# Patient Record
Sex: MALE | Race: WHITE | Employment: OTHER | URBAN - METROPOLITAN AREA
[De-identification: names, ages, dates, MRNs, and addresses within clinical notes are randomized per-mention and may not be internally consistent; named-entity substitution may affect disease eponyms.]

---

## 2017-02-03 ENCOUNTER — OFFICE VISIT (OUTPATIENT)
Dept: CARDIOLOGY CLINIC | Age: 77
End: 2017-02-03

## 2017-02-03 VITALS
DIASTOLIC BLOOD PRESSURE: 78 MMHG | RESPIRATION RATE: 16 BRPM | HEIGHT: 63 IN | BODY MASS INDEX: 25.66 KG/M2 | WEIGHT: 144.8 LBS | OXYGEN SATURATION: 97 % | SYSTOLIC BLOOD PRESSURE: 130 MMHG | HEART RATE: 68 BPM

## 2017-02-03 DIAGNOSIS — I44.7 LBBB (LEFT BUNDLE BRANCH BLOCK): ICD-10-CM

## 2017-02-03 DIAGNOSIS — I49.5 SSS (SICK SINUS SYNDROME) (HCC): ICD-10-CM

## 2017-02-03 DIAGNOSIS — I47.29 NSVT (NONSUSTAINED VENTRICULAR TACHYCARDIA): ICD-10-CM

## 2017-02-03 DIAGNOSIS — I42.9 CARDIOMYOPATHY (HCC): Primary | ICD-10-CM

## 2017-02-03 DIAGNOSIS — R05.8 COUGH DUE TO ACE INHIBITOR: ICD-10-CM

## 2017-02-03 DIAGNOSIS — T46.4X5A COUGH DUE TO ACE INHIBITOR: ICD-10-CM

## 2017-02-03 DIAGNOSIS — F43.21 GRIEF REACTION: ICD-10-CM

## 2017-02-03 DIAGNOSIS — I44.0 HEART BLOCK AV FIRST DEGREE: ICD-10-CM

## 2017-02-03 RX ORDER — NAPROXEN SODIUM 220 MG
220 TABLET ORAL 2 TIMES DAILY WITH MEALS
COMMUNITY

## 2017-02-03 NOTE — MR AVS SNAPSHOT
Visit Information Date & Time Provider Department Dept. Phone Encounter #  
 2/3/2017  2:20 PM Isidoro Guaman MD [facility] Brit Castorena [phone] [id number] Your Appointments 3/14/2017  1:00 PM  
COMPLETE PHYSICAL with Sahil Moulton MD  
[address] [place] [address]) Appt Note: CPE CP $0 gb 1/ [address] [address]  
[phone]  
  
   
 [address] [facility] 99 [address] Upcoming Health Maintenance Date Due Pneumococcal 65+ Low/Medium Risk (2 of 2 - PCV13) 7/29/2014 DTaP/Tdap/Td series (2 - Td) 6/29/2016 INFLUENZA AGE 9 TO ADULT 8/1/2016 MEDICARE YEARLY EXAM 12/7/2016 GLAUCOMA SCREENING Q2Y 12/7/2017 Allergies as of 2/3/2017  Review Complete On: 2/3/2017 By: Lvei Rosa RN Severity Noted Reaction Type Reactions Pcn [Penicillins]  04/18/2016    Rash Current Immunizations  Reviewed on 2/4/2016 Name Date Influenza Vaccine 10/12/2015 Pneumococcal Polysaccharide (PPSV-23) 7/29/2013 Tdap 6/29/2006 Zoster Vaccine, Live 10/7/2013 Not reviewed this visit Vitals BP Pulse Resp Height(growth percentile) Weight(growth percentile) SpO2  
 130/78 (BP 1 Location: Left arm, BP Patient Position: Sitting) 68 16 5' 3\" (1.6 m) 144 lb 12.8 oz (65.7 kg) 97% BMI Smoking Status 25.65 kg/m2 Former Smoker BMI and BSA Data Body Mass Index Body Surface Area  
 25.65 kg/m 2 1.71 m 2 Preferred Pharmacy Pharmacy Name Phone [facility] [address] AT  [address] [phone] Your Updated Medication List  
  
   
This list is accurate as of: 2/3/17  2:40 PM.  Always use your most recent med list.  
  
  
  
  
 ALEVE 220 mg tablet Generic drug:  naproxen sodium Take 220 mg by mouth two (2) times daily (with meals). allopurinol 300 mg tablet Commonly known as:  ZYLOPRIM  
TAKE 1 TABLET BY MOUTH DAILY  
  
 aspirin 325 mg tablet Commonly known as:  ASPIRIN Take 325 mg by mouth daily. fluticasone 50 mcg/actuation nasal spray Commonly known as:  Bryan Caller 2 puffs each nostril at bedtime  
  
 losartan 50 mg tablet Commonly known as:  COZAAR Take 1 Tab by mouth daily. This replaces lisinopril. He should not take both  
  
 simvastatin 40 mg tablet Commonly known as:  ZOCOR Take 1 Tab by mouth nightly. Introducing [facility]! Dear Tennille Mcbride: 
Thank you for requesting a [employer] [place] account. Our records indicate that you already have an active [employer] [place] account. You can access your account anytime at [email or web address] Did you know that you can access your hospital and ER discharge instructions at any time in [employer] [place]? You can also review all of your test results from your hospital stay or ER visit. Additional Information If you have questions, please visit the Frequently Asked Questions section of the [employer] [place] website at [email or web address] Remember, [employer] [place] is NOT to be used for urgent needs. For medical emergencies, dial 911. Now available from your iPhone and Android! Please provide this summary of care documentation to your next provider. Your primary care clinician is listed as Luis Manuel Lincoln. If you have any questions after today's visit, please call [phone].

## 2017-02-03 NOTE — PROGRESS NOTES
HISTORY OF PRESENT ILLNESS  Nancy Becerril is a 67 y.o. male. He is seen in followup for cardiomyopathy, which was originally diagnosed in [place]. He had ablation for ventricular tachycardia there. He supposedly had normal coronary arteries by catheterization. He has left bundle branch block and his ejection fraction is mildly reduced at 45% by noninvasive testing. He continues to drink alcohol, but he is now drinking wine that he does not like as much so he will drink less of it. He is still grieving over the death of his wife. He was on a low-dose betablocker, but became bradycardic and had low blood pressure. He is now off carvedilol and he feels better. He is travelling frequently with his neighbor, who also lost her . Recent liver function tests show that his transaminases are normal, but his alkaline phosphatase is mildly elevated. He has chronic PVCs by EKG. [facility]  Patient Active Problem List   Diagnosis Code    Other and unspecified hyperlipidemia E78.5    Essential hypertension, benign I10    Sinus bradycardia R00.1    LBBB (left bundle branch block) I44.7    NSVT (nonsustained ventricular tachycardia) ([facility]) I47.2    CHF (congestive heart failure) ([facility]) I50.9    Cardiomyopathy ([facility]) I42.9    SSS (sick sinus syndrome) ([facility]) I49.5    Grief reaction F43.20    Osteopenia M85.80    Cough due to ACE inhibitor R05, T46.4X5A     Current Outpatient Prescriptions   Medication Sig Dispense Refill    naproxen sodium (ALEVE) 220 mg tablet Take 220 mg by mouth two (2) times daily (with meals).  allopurinol (ZYLOPRIM) 300 mg tablet TAKE 1 TABLET BY MOUTH DAILY 90 Tab 3    losartan (COZAAR) 50 mg tablet Take 1 Tab by mouth daily. This replaces lisinopril. He should not take both 90 Tab 3    simvastatin (ZOCOR) 40 mg tablet Take 1 Tab by mouth nightly. 90 Tab 3    aspirin (ASPIRIN) 325 mg tablet Take 325 mg by mouth daily.         fluticasone (FLONASE) 50 mcg/actuation nasal spray 2 puffs each nostril at bedtime 1 Bottle 11     Past Medical History   Diagnosis Date    [facility]rial fibrillation ([email or web address]     CAD (coronary artery disease)     CHF (congestive heart failure) ([facility] 2/27/2013    Essential hypertension     Hyperlipidemia     Myocardial infarction ([facility]     Stroke [facility])      Past Surgical History   Procedure Laterality Date    Hx appendectomy      Hx cervical laminectomy      Hx other surgical  2006     VT ablation        Review of Systems   Psychiatric/Behavioral: Positive for depression. All other systems reviewed and are negative. Visit Vitals    /78 (BP 1 Location: Left arm, BP Patient Position: Sitting)    Pulse 68    Resp 16    Ht 5' 3\" (1.6 m)    Wt 144 lb 12.8 oz (65.7 kg)    SpO2 97%    BMI 25.65 kg/m2       Physical Exam   Constitutional: He is oriented to person, place, and time. He appears well-nourished. HENT:   Head: Atraumatic. Eyes: Conjunctivae are normal.   Neck: Neck supple. Cardiovascular: Normal rate, regular rhythm and normal heart sounds. Exam reveals no gallop and no friction rub. No murmur heard. Pulmonary/Chest: Breath sounds normal. He has no wheezes. Abdominal: Bowel sounds are normal.   Musculoskeletal: He exhibits no edema. Neurological: He is oriented to person, place, and time. Skin: Skin is dry. Nursing note and vitals reviewed. ASSESSMENT and PLAN  Overall, he is doing fairly well. He did not show up for stress testing on several occasions. He will not stop drinking alcohol. However, he is doing the best he can despite being depressed over the death of his wife. For now, I will continue his regimen and see him back in six months.

## 2017-02-03 NOTE — PROGRESS NOTES
Chief Complaint   Patient presents with    Hypertension     Follow up visit. Complaints of \"heart pounding\" at night. Denies chest pain/shortness of breath/swelling. Complaints of congestion.  CHF     States arthritic pain or discomfort. Takes aleve prn.

## 2017-03-07 ENCOUNTER — DOCUMENTATION ONLY (OUTPATIENT)
Dept: FAMILY MEDICINE CLINIC | Age: 77
End: 2017-03-07

## 2017-03-07 NOTE — PROGRESS NOTES
HISTORY OF PRESENT ILLNESS  Ya Phelps is a 67 y.o. male.   HPI    ROS    Physical Exam    ASSESSMENT and PLAN

## 2017-04-24 ENCOUNTER — TELEPHONE (OUTPATIENT)
Dept: FAMILY MEDICINE CLINIC | Age: 77
End: 2017-04-24

## 2017-04-24 NOTE — TELEPHONE ENCOUNTER
Letter rec'd in the mail for request of records to be sent to Dr. Dayna Justice. Anita Smyth [facility], [address] [facility]    Called and left voicemail for patient to come by the office and sign medical record release. No other information was left.

## 2017-05-30 ENCOUNTER — OFFICE VISIT (OUTPATIENT)
Dept: INTERNAL MEDICINE CLINIC | Age: 77
End: 2017-05-30

## 2017-05-30 ENCOUNTER — HOSPITAL ENCOUNTER (OUTPATIENT)
Dept: LAB | Age: 77
Discharge: HOME | End: 2017-05-30
Payer: MEDICARE

## 2017-05-30 VITALS
OXYGEN SATURATION: 98 % | RESPIRATION RATE: 19 BRPM | HEIGHT: 61 IN | TEMPERATURE: 98.1 F | DIASTOLIC BLOOD PRESSURE: 80 MMHG | BODY MASS INDEX: 26.73 KG/M2 | HEART RATE: 92 BPM | WEIGHT: 141.6 LBS | SYSTOLIC BLOOD PRESSURE: 130 MMHG

## 2017-05-30 DIAGNOSIS — I42.9 CARDIOMYOPATHY, UNSPECIFIED TYPE (HCC): ICD-10-CM

## 2017-05-30 DIAGNOSIS — E78.5 OTHER AND UNSPECIFIED HYPERLIPIDEMIA: ICD-10-CM

## 2017-05-30 DIAGNOSIS — R10.32 LEFT GROIN PAIN: ICD-10-CM

## 2017-05-30 DIAGNOSIS — Z23 NEED FOR TDAP VACCINATION: ICD-10-CM

## 2017-05-30 DIAGNOSIS — I10 ESSENTIAL HYPERTENSION, BENIGN: ICD-10-CM

## 2017-05-30 DIAGNOSIS — Z23 ENCOUNTER FOR IMMUNIZATION: ICD-10-CM

## 2017-05-30 DIAGNOSIS — I50.20 SYSTOLIC CONGESTIVE HEART FAILURE, UNSPECIFIED CONGESTIVE HEART FAILURE CHRONICITY: ICD-10-CM

## 2017-05-30 DIAGNOSIS — Z76.89 ENCOUNTER TO ESTABLISH CARE: Primary | ICD-10-CM

## 2017-05-30 DIAGNOSIS — Z12.5 PROSTATE CANCER SCREENING: ICD-10-CM

## 2017-05-30 PROCEDURE — 80053 COMPREHEN METABOLIC PANEL: CPT

## 2017-05-30 PROCEDURE — 84153 ASSAY OF PSA TOTAL: CPT

## 2017-05-30 PROCEDURE — 80061 LIPID PANEL: CPT

## 2017-05-30 PROCEDURE — 85025 COMPLETE CBC W/AUTO DIFF WBC: CPT

## 2017-05-30 PROCEDURE — 36415 COLL VENOUS BLD VENIPUNCTURE: CPT

## 2017-05-30 NOTE — PROGRESS NOTES
New Patient Evaluation    Lázaro Harden is a 67 y.o. male. They are here to establish care with the group and me as a primary care provider. he has seen Dr. Dolores Camilo in the past.  The last visit was over a year ago. He has a history of ventricular tachycardia and cardiomyopathy. Diagnosed many years ago while living in [place]. He had a cath and then an ablation procedure. No further procedures since then. He sees cardiology (Dr. Tanner Velez). He notes that he is well managed with medications. He has not had a colonoscopy. He does complain of lower abdominal pain present on the left. He had a previous left inguinal hernia repair (the patient does not remember when--likely over 20 years ago). He notes that this pain had been present for nearly a year. Worsened with flexion of the left hip. Also tender to palpation. He has not had this investigated since its onset. He denies problems with urination/defecation. His wife passed away several years ago. Related to smoking. He is a [place]. Patient Active Problem List    Diagnosis Date Noted    Cough due to ACE inhibitor 12/07/2015    Osteopenia 12/30/2013    Grief reaction 09/03/2013    Cardiomy[facility] 03/27/2013    SSS (sick sinus syndrome) ([facility] 03/27/2013    NSVT (nonsustained ventricular tachycardia) ([facility] 02/27/2013    CHF (congestive heart failure) ([facility] 02/27/2013    Other and unspecified hyperlipidemia 02/06/2013    Essential hypertension, benign 02/06/2013    Sinus bradycardia 02/06/2013    LBBB (left bundle branch block) 02/06/2013     Current Outpatient Prescriptions   Medication Sig Dispense Refill    naproxen sodium (ALEVE) 220 mg tablet Take 220 mg by mouth two (2) times daily (with meals).       allopurinol (ZYLOPRIM) 300 mg tablet TAKE 1 TABLET BY MOUTH DAILY 90 Tab 3    fluticasone (FLONASE) 50 mcg/actuation nasal spray 2 puffs each nostril at bedtime 1 Bottle 11    losartan (COZAAR) 50 mg tablet Take 1 Tab by mouth daily. This replaces lisinopril. He should not take both 90 Tab 3    simvastatin (ZOCOR) 40 mg tablet Take 1 Tab by mouth nightly. 90 Tab 3    aspirin (ASPIRIN) 325 mg tablet Take 325 mg by mouth daily. Allergies   Allergen Reactions    Pcn [Penicillins] Rash     Past Medical History:   Diagnosis Date    [facility] ([facility]     CAD (coronary artery disease)     CHF (congestive heart failure) ([facility] 2/27/2013    Essential hypertension     Hyperlipidemia     Myocardial infarction ([facility]     Rheumatoid arteritis ([facility]     Stroke [facility])      Past Surgical History:   Procedure Laterality Date    HX APPENDECTOMY      HX CERVICAL LAMINECTOMY      HX OTHER SURGICAL  2006    VT ablation      Family History   Problem Relation Age of Onset    Cancer Mother     Heart Disease Brother      Social History   Substance Use Topics    Smoking status: Former Smoker    Smokeless tobacco: Never Used    Alcohol use 4.8 oz/week     8 Standard drinks or equivalent per week        Health Maintenance   Topic Date Due    Pneumococcal 65+ Low/Medium Risk (2 of 2 - PCV13) 07/29/2014    DTaP/Tdap/Td series (2 - Td) 06/29/2016    MEDICARE YEARLY EXAM  12/07/2016    INFLUENZA AGE 9 TO ADULT  08/01/2017    GLAUCOMA SCREENING Q2Y  12/07/2017    ZOSTER VACCINE AGE 60>  Completed       Review of Systems   Constitutional: Negative. Cardiovascular: Negative. Gastrointestinal: Positive for abdominal pain. Visit Vitals    /80 (BP 1 Location: Right arm, BP Patient Position: Sitting)    Pulse 92    Temp 98.1 °F (36.7 °C) (Oral)    Resp 19    Ht 5' 1.26\" (1.556 m)    Wt 141 lb 9.6 oz (64.2 kg)    SpO2 98%    BMI 26.53 kg/m2       Physical Exam   Constitutional: He is well-developed, well-nourished, and in no distress. No distress. Cardiovascular: Normal rate, regular rhythm and normal heart sounds.     Pulmonary/Chest: Effort normal and breath sounds normal. Musculoskeletal: Normal range of motion. Neurological: He is alert. ASSESSMENT/PLAN    Lydia Rea was seen today for establish care and inguinal hernia. Diagnoses and all orders for this visit:    Encounter to establish care  -     CBC WITH AUTOMATED DIFF  -     METABOLIC PANEL, COMPREHENSIVE  -     LIPID PANEL  -     pneumococcal 13 shant conj dip (PREVNAR-13) 0.5 mL syrg injection; 0.5 mL by IntraMUSCular route once for 1 dose. Need for Tdap vaccination  -     diph,Pertuss,Acell,,Tet Vac-PF (ADACEL) 2 Lf-(2.5-5-3-5 mcg)-5Lf/0.5 mL susp; 0.5 mL by IntraMUSCular route once for 1 dose. Encounter for immunization    Left groin pain  -     US ABD LTD; Future    Systolic congestive heart failure, unspecified congestive heart failure chronicity (HCC)    Cardiomyopathy, unspecified type  -     LIPID PANEL    Other and unspecified hyperlipidemia    Essential hypertension, benign    Prostate cancer screening  -     PROSTATE SPECIFIC AG    Other orders  -     Cancel: TSH 3RD GENERATION      Follow-up Disposition:  Return in about 3 months (around 8/30/2017) for Medicare Wellness Visit- 30 minute appointment.    -Discussed with the patient to continue the current plan of care. We will obtain baseline labwork and determine if any adjustments need to be done. We will also await the records of the previous PCP to ascertain further details of the patient's history. The patient agrees with and understands the plan of care. All questions have been answered.

## 2017-05-30 NOTE — PATIENT INSTRUCTIONS

## 2017-05-30 NOTE — MR AVS SNAPSHOT
Visit Information Date & Time Provider Department Dept. Phone Encounter #  
 5/30/2017  2:15 PM Nicky Dupree [facility] [phone] [id number] Follow-up Instructions Return in about 3 months (around 8/30/2017) for Medicare Wellness Visit- 30 minute appointment. Your Appointments 8/14/2017  3:00 PM  
ESTABLISHED PATIENT with Ayush Romero MD  
[facility] ([facility] SCHEDULING) Appt Note: 6 month follow up  
 [address]  
[address] [address] Upcoming Health Maintenance Date Due Pneumococcal 65+ Low/Medium Risk (2 of 2 - PCV13) 7/29/2014 DTaP/Tdap/Td series (2 - Td) 6/29/2016 MEDICARE YEARLY EXAM 12/7/2016 INFLUENZA AGE 9 TO ADULT 8/1/2017 GLAUCOMA SCREENING Q2Y 12/7/2017 Allergies as of 5/30/2017  Review Complete On: 5/30/2017 By: Renella Romberg, MD  
  
 Severity Noted Reaction Type Reactions Pcn [Penicillins]  04/18/2016    Rash Current Immunizations  Reviewed on 2/4/2016 Name Date Influenza Vaccine 10/12/2015 Pneumococcal Polysaccharide (PPSV-23) 7/29/2013 Tdap 6/29/2006 Zoster Vaccine, Live 10/7/2013 Not reviewed this visit You Were Diagnosed With   
  
 Codes Comments Encounter to establish care    -  Primary ICD-10-CM: Z76.89 
ICD-9-CM: V65.8 Need for Tdap vaccination     ICD-10-CM: F61 ICD-9-CM: V06.1 Encounter for immunization     ICD-10-CM: V78 ICD-9-CM: V03.89 Left groin pain     ICD-10-CM: R10.30 ICD-9-CM: 789.09 Systolic congestive heart failure, unspecified congestive heart failure chronicity (HCC)     ICD-10-CM: I50.20 ICD-9-CM: 428.20, 428.0 Cardiomyopathy, unspecified type     ICD-10-CM: I42.9 ICD-9-CM: 425.4 Other and unspecified hyperlipidemia     ICD-10-CM: E78.5 ICD-9-CM: 272.4 Essential hypertension, benign     ICD-10-CM: I10 
ICD-9-CM: 401.1 Prostate cancer screening     ICD-10-CM: Z12.5 ICD-9-CM: V76.44 Vitals BP Pulse Temp Resp Height(growth percentile) Weight(growth percentile) 130/80 (BP 1 Location: Right arm, BP Patient Position: Sitting) 92 98.1 °F (36.7 °C) (Oral) 19 5' 1.26\" (1.556 m) 141 lb 9.6 oz (64.2 kg) SpO2 BMI Smoking Status 98% 26.53 kg/m2 Former Smoker BMI and BSA Data Body Mass Index Body Surface Area  
 26.53 kg/m 2 1.67 m 2 Preferred Pharmacy Pharmacy Name Phone [facility] [address] AT [address] [phone] Your Updated Medication List  
  
   
This list is accurate as of: 5/30/17  3:08 PM.  Always use your most recent med list.  
  
  
  
  
 ALEVE 220 mg tablet Generic drug:  naproxen sodium Take 220 mg by mouth two (2) times daily (with meals). allopurinol 300 mg tablet Commonly known as:  ZYLOPRIM  
TAKE 1 TABLET BY MOUTH DAILY  
  
 aspirin 325 mg tablet Commonly known as:  ASPIRIN Take 325 mg by mouth daily. fluticasone 50 mcg/actuation nasal spray Commonly known as:  Chales Copa 2 puffs each nostril at bedtime  
  
 losartan 50 mg tablet Commonly known as:  COZAAR Take 1 Tab by mouth daily. This replaces lisinopril. He should not take both  
  
 simvastatin 40 mg tablet Commonly known as:  ZOCOR Take 1 Tab by mouth nightly. We Performed the Following CBC WITH AUTOMATED DIFF [83860 CPT(R)] LIPID PANEL [72516 CPT(R)] METABOLIC PANEL, COMPREHENSIVE [45366 CPT(R)] PROSTATE SPECIFIC AG (PSA) [id number] CPT(R)] Follow-up Instructions Return in about 3 months (around 8/30/2017) for Medicare Wellness Visit- 30 minute appointment. To-Do List   
 06/30/2017 Imaging:  US ABD LTD Patient Instructions Abdominal Pain: Care Instructions Your Care Instructions Abdominal pain has many possible causes. Some aren't serious and get better on their own in a few days. Others need more testing and treatment. If your pain continues or gets worse, you need to be rechecked and may need more tests to find out what is wrong. You may need surgery to correct the problem. Don't ignore new symptoms, such as fever, nausea and vomiting, urination problems, pain that gets worse, and dizziness. These may be signs of a more serious problem. Your doctor may have recommended a follow-up visit in the next 8 to 12 hours. If you are not getting better, you may need more tests or treatment. The doctor has checked you carefully, but problems can develop later. If you notice any problems or new symptoms, get medical treatment right away. Follow-up care is a key part of your treatment and safety. Be sure to make and go to all appointments, and call your doctor if you are having problems. It's also a good idea to know your test results and keep a list of the medicines you take. How can you care for yourself at home? · Rest until you feel better. · To prevent dehydration, drink plenty of fluids, enough so that your urine is light yellow or clear like water. Choose water and other caffeine-free clear liquids until you feel better. If you have kidney, heart, or liver disease and have to limit fluids, talk with your doctor before you increase the amount of fluids you drink. · If your stomach is upset, eat mild foods, such as rice, dry toast or crackers, bananas, and applesauce. Try eating several small meals instead of two or three large ones. · Wait until 48 hours after all symptoms have gone away before you have spicy foods, alcohol, and drinks that contain caffeine. · Do not eat foods that are high in fat. · Avoid anti-inflammatory medicines such as aspirin, ibuprofen (Advil, Motrin), and naproxen (Aleve). These can cause stomach upset.  Talk to your doctor if you take daily aspirin for another health problem. When should you call for help? Call 911 anytime you think you may need emergency care. For example, call if: 
· You passed out (lost consciousness). · You pass maroon or very bloody stools. · You vomit blood or what looks like coffee grounds. · You have new, severe belly pain. Call your doctor now or seek immediate medical care if: 
· Your pain gets worse, especially if it becomes focused in one area of your belly. · You have a new or higher fever. · Your stools are black and look like tar, or they have streaks of blood. · You have unexpected vaginal bleeding. · You have symptoms of a urinary tract infection. These may include: 
¨ Pain when you urinate. ¨ Urinating more often than usual. 
¨ Blood in your urine. · You are dizzy or lightheaded, or you feel like you may faint. Watch closely for changes in your health, and be sure to contact your doctor if: 
· You are not getting better after 1 day (24 hours). Where can you learn more? Go to http://vamshi-abena.info/. Enter F585 in the search box to learn more about \"Abdominal Pain: Care Instructions. \" Current as of: May 27, 2016 Content Version: 11.2 © 9093-5747 [employer], [employer]. Care instructions adapted under license by [employer] (which disclaims liability or warranty for this information). If you have questions about a medical condition or this instruction, always ask your healthcare professional. [place] [address] any warranty or liability for your use of this information. Introducing [facility] HE[facility]! Dear Daniel Gilliland: 
Thank you for requesting a [employer] account. Our records indicate that you already have an active [employer] account. You can access your account anytime at [email or web address] Did you know that you can access your hospital and ER discharge instructions at any time in [employer]? You can also review all of your test results from your hospital stay or ER visit. Additional Information If you have questions, please visit the Frequently Asked Questions section of the [employer] website at [email or web address] Remember, [employer] is NOT to be used for urgent needs. For medical emergencies, dial 911. Now available from your iPhone and Android! Please provide this summary of care documentation to your next provider. Your primary care clinician is listed as Aravind Clarke. If you have any questions after today's visit, please call [phone].

## 2017-05-31 LAB
ALBUMIN SERPL-MCNC: 4.2 G/DL (ref 3.5–4.8)
ALBUMIN/GLOB SERPL: 2 {RATIO} (ref 1.2–2.2)
ALP SERPL-CCNC: 182 IU/L (ref 39–117)
ALT SERPL-CCNC: 102 IU/L (ref 0–44)
AST SERPL-CCNC: 113 IU/L (ref 0–40)
BASOPHILS # BLD AUTO: 0 X10E3/UL (ref 0–0.2)
BASOPHILS NFR BLD AUTO: 0 %
BILIRUB SERPL-MCNC: 1 MG/DL (ref 0–1.2)
BUN SERPL-MCNC: 10 MG/DL (ref 8–27)
BUN/CREAT SERPL: 10 (ref 10–24)
CALCIUM SERPL-MCNC: 9.6 MG/DL (ref 8.6–10.2)
CHLORIDE SERPL-SCNC: 100 MMOL/L (ref 96–106)
CHOLEST SERPL-MCNC: 180 MG/DL (ref 100–199)
CO2 SERPL-SCNC: 23 MMOL/L (ref 18–29)
CREAT SERPL-MCNC: 1 MG/DL (ref 0.76–1.27)
EOSINOPHIL # BLD AUTO: 0 X10E3/UL (ref 0–0.4)
EOSINOPHIL NFR BLD AUTO: 0 %
ERYTHROCYTE [DISTWIDTH] IN BLOOD BY AUTOMATED COUNT: 13.5 % (ref 12.3–15.4)
GLOBULIN SER CALC-MCNC: 2.1 G/DL (ref 1.5–4.5)
GLUCOSE SERPL-MCNC: 102 MG/DL (ref 65–99)
HCT VFR BLD AUTO: 50.4 % (ref 37.5–51)
HDLC SERPL-MCNC: 99 MG/DL
HGB BLD-MCNC: 17.2 G/DL (ref 12.6–17.7)
IMM GRANULOCYTES # BLD: 0 X10E3/UL (ref 0–0.1)
IMM GRANULOCYTES NFR BLD: 0 %
LDLC SERPL CALC-MCNC: 65 MG/DL (ref 0–99)
LYMPHOCYTES # BLD AUTO: 1.6 X10E3/UL (ref 0.7–3.1)
LYMPHOCYTES NFR BLD AUTO: 24 %
MCH RBC QN AUTO: 34.2 PG (ref 26.6–33)
MCHC RBC AUTO-ENTMCNC: 34.1 G/DL (ref 31.5–35.7)
MCV RBC AUTO: 100 FL (ref 79–97)
MONOCYTES # BLD AUTO: 0.5 X10E3/UL (ref 0.1–0.9)
MONOCYTES NFR BLD AUTO: 8 %
NEUTROPHILS # BLD AUTO: 4.5 X10E3/UL (ref 1.4–7)
NEUTROPHILS NFR BLD AUTO: 68 %
PLATELET # BLD AUTO: 227 X10E3/UL (ref 150–379)
POTASSIUM SERPL-SCNC: 5.2 MMOL/L (ref 3.5–5.2)
PROT SERPL-MCNC: 6.3 G/DL (ref 6–8.5)
PSA SERPL-MCNC: 4 NG/ML (ref 0–4)
RBC # BLD AUTO: 5.03 X10E6/UL (ref 4.14–5.8)
SODIUM SERPL-SCNC: 140 MMOL/L (ref 134–144)
TRIGL SERPL-MCNC: 79 MG/DL (ref 0–149)
VLDLC SERPL CALC-MCNC: 16 MG/DL (ref 5–40)
WBC # BLD AUTO: 6.7 X10E3/UL (ref 3.4–10.8)

## 2017-06-01 NOTE — TELEPHONE ENCOUNTER
Adding to closed encounter:      Per review of media in the chart, patient did sign medical release. States changing providers.

## 2017-06-07 ENCOUNTER — HOSPITAL ENCOUNTER (OUTPATIENT)
Dept: ULTRASOUND IMAGING | Age: 77
Discharge: HOME | End: 2017-06-07
Attending: INTERNAL MEDICINE
Payer: MEDICARE

## 2017-06-07 DIAGNOSIS — R10.32 LEFT GROIN PAIN: ICD-10-CM

## 2017-06-07 PROCEDURE — 76882 US LMTD JT/FCL EVL NVASC XTR: CPT

## 2017-06-28 ENCOUNTER — TELEPHONE (OUTPATIENT)
Dept: INTERNAL MEDICINE CLINIC | Age: 77
End: 2017-06-28

## 2017-06-29 DIAGNOSIS — R74.8 ELEVATED LIVER ENZYMES: Primary | ICD-10-CM

## 2017-06-29 NOTE — PROGRESS NOTES
Patient has been informed per drs result notes and recommendations, pt verbalizes understanding.  LFT test mailed to pt

## 2017-06-29 NOTE — PROGRESS NOTES
Labs look good overall but the liver function is elevated. This may be due to his cholesterol medication. We will recheck this. Please send him an order for LFT's to be done.

## 2017-08-14 ENCOUNTER — OFFICE VISIT (OUTPATIENT)
Dept: CARDIOLOGY CLINIC | Age: 77
End: 2017-08-14

## 2017-08-14 VITALS
BODY MASS INDEX: 25.67 KG/M2 | HEART RATE: 59 BPM | DIASTOLIC BLOOD PRESSURE: 66 MMHG | SYSTOLIC BLOOD PRESSURE: 110 MMHG | WEIGHT: 137 LBS

## 2017-08-14 DIAGNOSIS — I42.9 CARDIOMYOPATHY, UNSPECIFIED TYPE (HCC): Primary | ICD-10-CM

## 2017-08-14 DIAGNOSIS — I50.20 SYSTOLIC CONGESTIVE HEART FAILURE, UNSPECIFIED CONGESTIVE HEART FAILURE CHRONICITY: ICD-10-CM

## 2017-08-14 DIAGNOSIS — I49.3 VENTRICULAR ECTOPIC ACTIVITY: ICD-10-CM

## 2017-08-14 DIAGNOSIS — I49.5 SSS (SICK SINUS SYNDROME) (HCC): ICD-10-CM

## 2017-08-14 DIAGNOSIS — I44.7 LBBB (LEFT BUNDLE BRANCH BLOCK): ICD-10-CM

## 2017-08-14 NOTE — PROGRESS NOTES
HISTORY OF PRESENT ILLNESS  Hailee Mariee is a 67 y.o. male. He has a history of cardiomyopathy and ventricular [place] with residual left bundle branch block. His ejection fraction is mildly reduced. He has been drinking quite a bit of alcohol, especially since his wife  several years ago leading to depression. He is travelling with a friend, however, and is fairly asymptomatic. It appears that he is not taking any of his medications. HPI  Patient Active Problem List   Diagnosis Code    Other and unspecified hyperlipidemia E78.5    Essential hypertension, benign I10    Sinus bradycardia R00.1    LBBB (left bundle branch block) I44.7    NSVT (nonsustained ventricular tachycardia) ([facility]) I47.2    CHF (congestive heart failure) ([facility]) I50.9    Cardiomyopathy ([facility]) I42.9    SSS (sick sinus syndrome) ([facility]) I49.5    Grief reaction F43.20    Osteopenia M85.80    Cough due to ACE inhibitor R05, T46.4X5A     Current Outpatient Prescriptions   Medication Sig Dispense Refill    aspirin (ASPIRIN) 325 mg tablet Take 325 mg by mouth daily.  naproxen sodium (ALEVE) 220 mg tablet Take 220 mg by mouth two (2) times daily (with meals).  allopurinol (ZYLOPRIM) 300 mg tablet TAKE 1 TABLET BY MOUTH DAILY 90 Tab 3    fluticasone (FLONASE) 50 mcg/actuation nasal spray 2 puffs each nostril at bedtime 1 Bottle 11     Past Medical History:   Diagnosis Date    Atrial fibrillation (HCC)     CAD (coronary artery disease)     [facility]congestive [facility] failure) [facility] 2013    Essential hypertension     Hyperlipidemia     Myocardial infarction ([facility]     Rheumatoid arteritis     Stroke [facility])      Past Surgical History:   Procedure Laterality Date    HX APPENDECTOMY      HX CERVICAL LAMINECTOMY      HX OTHER SURGICAL  2006    VT ablation        Review of Systems   Psychiatric/Behavioral: Positive for depression.    All other systems reviewed and are negative. Visit Vitals    /66 (BP 1 Location: Left arm, BP Patient Position: Sitting)    Pulse (!) 59    Wt 137 lb (62.1 kg)    BMI 25.67 kg/m2       Physical Exam   Constitutional: He is oriented to person, place, and time. He appears well-nourished. HENT:   Head: Atraumatic. Eyes: Conjunctivae are normal.   Neck: Neck supple. Cardiovascular: Normal rate, regular rhythm and normal heart sounds. Exam reveals no gallop and no friction rub. No murmur heard. Pulmonary/Chest: Breath sounds normal. He has no wheezes. Abdominal: Bowel sounds are normal. There is no tenderness. Musculoskeletal: He exhibits no edema. Neurological: He is oriented to person, place, and time. Skin: Skin is dry. Nursing note and vitals reviewed. ASSESSMENT and PLAN  Since he is not taking his losartan, I will just leave him off of it. His blood pressure is only 085 systolic. Also, at his age, I do not think he needs to be on the simvastatin so I will stop this medication. He presumably had normal coronary arteries noted by catheterization years ago, although, I do not have the records. I will see him back in six months.

## 2017-08-14 NOTE — MR AVS SNAPSHOT
Visit Information Date & Time Provider Department Dept. Phone Encounter #  
 8/14/2017  3:00 PM Yoselyn Ibarra MD [facility] [place] [phone] [id number] Upcoming Health Maintenance Date Due Pneumococcal 65+ Low/Medium Risk (2 of 2 - PCV13) 7/29/2014 DTaP/Tdap/Td series (2 - Td) 6/29/2016 MEDICARE YEARLY EXAM 12/7/2016 INFLUENZA AGE 9 TO ADULT 8/1/2017 GLAUCOMA SCREENING Q2Y 12/7/2017 Allergies as of 8/14/2017  Review Complete On: 8/14/2017 By: Lucio Castanon Severity Noted Reaction Type Reactions Pcn [Penicillins]  04/18/2016    Rash Current Immunizations  Reviewed on 2/4/2016 Name Date Influenza Vaccine 10/12/2015 Pneumococcal Polysaccharide (PPSV-23) 7/29/2013 Tdap 6/29/2006 Zoster Vaccine, Live 10/7/2013 Not reviewed this visit You Were Diagnosed With   
  
 Codes Comments Cardiomyopathy, unspecified type [facility]    -  Primary ICD-10-CM: I42.9 ICD-9-CM: 425.4 SSS (sick sinus syndrome) (HCC)     ICD-10-CM: I49.5 ICD-9-CM: 427.81   
 LBBB (left bundle branch block)     ICD-10-CM: I44.7 ICD-9-CM: 426.3 Systolic congestive heart failure, unspecified congestive heart failure chronicity (HCC)     ICD-10-CM: I50.20 ICD-9-CM: 428.20, 428.0 Vitals BP Pulse Weight(growth percentile) BMI Smoking Status 110/66 (BP 1 Location: Left arm, BP Patient Position: Sitting) (!) 59 137 lb (62.1 kg) 25.67 kg/m2 Former Smoker Vitals History BMI and BSA Data Body Mass Index Body Surface Area  
 25.67 kg/m 2 1.64 m 2 Preferred Pharmacy Pharmacy Name Phone [facility] [address] [phone] Your Updated Medication List  
  
   
This list is accurate as of: 8/14/17  3:17 PM.  Always use your most recent med list.  
  
  
  
  
 ALEVE 220 mg tablet Generic drug:  naproxen sodium Take 220 mg by mouth two (2) times daily (with meals). allopurinol 300 mg tablet Commonly known as:  ZYLOPRIM  
TAKE 1 TABLET BY MOUTH DAILY  
  
 aspirin 325 mg tablet Commonly known as:  ASPIRIN Take 325 mg by mouth daily. fluticasone 50 mcg/actuation nasal spray Commonly known as:  [employer] 2 puffs each nostril at bedtime  
  
 losartan 50 mg tablet Commonly known as:  COZAAR Take 1 Tab by mouth daily. This replaces lisinopril. He should not take both  
  
 simvastatin 40 mg tablet Commonly known as:  ZOCOR Take 1 Tab by mouth nightly. We Performed the Following AMB POC EKG ROUTINE W/ 12 LEADS, INTER & REP [79097 CPT(R)] Introducing \[facility] [place] [facility]\"" & [facility] SERVICES! Dear Ishmael Lei: 
Thank you for requesting a [employer] account. Our records indicate that you already have an active [employer] account. You can access your account anytime at [email or web address] Did you know that you can access your hospital and ER discharge instructions at any time in Pixc? You can also review all of your test results from your hospital stay or ER visit. Additional Information If you have questions, please visit the Frequently Asked Questions section of the [employer]c website at [email or web address] Remember, [employer]c is NOT to be used for urgent needs. For medical emergencies, dial 911. Now available from your iPhone and Android! Please provide this summary of care documentation to your next provider. Your primary care clinician is listed as Eugene Romano. If you have any questions after today's visit, please call [phone].

## 2017-11-14 ENCOUNTER — TELEPHONE (OUTPATIENT)
Dept: INTERNAL MEDICINE CLINIC | Age: 77
End: 2017-11-14

## 2017-11-29 ENCOUNTER — OFFICE VISIT (OUTPATIENT)
Dept: INTERNAL MEDICINE CLINIC | Age: 77
End: 2017-11-29

## 2017-11-29 VITALS
TEMPERATURE: 98 F | BODY MASS INDEX: 24.09 KG/M2 | WEIGHT: 127.6 LBS | HEIGHT: 61 IN | SYSTOLIC BLOOD PRESSURE: 100 MMHG | RESPIRATION RATE: 19 BRPM | OXYGEN SATURATION: 98 % | HEART RATE: 97 BPM | DIASTOLIC BLOOD PRESSURE: 80 MMHG

## 2017-11-29 DIAGNOSIS — Z23 NEED FOR PNEUMOCOCCAL VACCINATION: ICD-10-CM

## 2017-11-29 DIAGNOSIS — R79.89 ELEVATED LFTS: ICD-10-CM

## 2017-11-29 DIAGNOSIS — Z23 NEED FOR TDAP VACCINATION: ICD-10-CM
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
## 2017-11-29 DIAGNOSIS — I10 ESSENTIAL HYPERTENSION, BENIGN: Primary | ICD-10-CM

## 2017-11-29 NOTE — MR AVS SNAPSHOT
Visit Information Date & Time Provider Department Dept. Phone Encounter #  
 11/29/2017  2:00 PM Tereza Barron MD [facility] Internal Medicine [phone] [id number] Your Appointments 2/12/2018  2:20 PM  
ESTABLISHED PATIENT with Maranda Gonzalez MD  
[facility] ([address]) Appt Note: 6 month follow up  
 [address] [place]  
[address] [facility] 7 [address] Upcoming Health Maintenance Date Due Pneumococcal 65+ Low/Medium Risk (2 of 2 - PCV13) 7/29/2014 DTaP/Tdap/Td series (2 - Td) 6/29/2016 MEDICARE YEARLY EXAM 12/7/2016 Influenza Age 5 to Adult 8/1/2017 GLAUCOMA SCREENING Q2Y 12/7/2017 Allergies as of 11/29/2017  Review Complete On: 11/29/2017 By: Tereza Barron MD  
  
 Severity Noted Reaction Type Reactions Pcn [Penicillins]  04/18/2016    Rash Current Immunizations  Reviewed on 2/4/2016 Name Date Influenza Vaccine 10/12/2015 Pneumococcal Polysaccharide (PPSV-23) 7/29/2013 Tdap 6/29/2006 Zoster Vaccine, Live 10/7/2013 Not reviewed this visit You Were Diagnosed With   
  
 Codes Comments Essential hypertension, benign    -  Primary ICD-10-CM: I10 
ICD-9-CM: 401.1 Fall, initial encounter     ICD-10-CM: W19. Joshua [place] ICD-9-CM: E888.9 Need for Tdap vaccination     ICD-10-CM: Q20 ICD-9-CM: V06.1 Need for pneumococcal vaccination     ICD-10-CM: A43 ICD-9-CM: V03.82 Elevated LFTs     ICD-10-CM: R79.89 ICD-9-CM: 790.6 Vitals BP Pulse Temp Resp Height(growth percentile) Weight(growth percentile) 100/80 (BP 1 Location: Right arm, BP Patient Position: Sitting) 97 98 °F (36.7 °C) (Oral) 19 5' 1.26\" (1.556 m) 127 lb 9.6 oz (57.9 kg) SpO2 BMI Smoking Status 98% 23.91 kg/m2 Former Smoker Vitals History BMI and BSA Data Body Mass Index Body Surface Area 23.91 kg/m 2 1.58 m 2 Preferred Pharmacy Pharmacy Name Phone [facility] [place],  [address] [phone] Your Updated Medication List  
  
   
This list is accurate as of: 17  2:36 PM.  Always use your most recent med list.  
  
  
  
  
 ALEVE 220 mg tablet Generic drug:  naproxen sodium Take 220 mg by mouth two (2) times daily (with meals). allopurinol 300 mg tablet Commonly known as:  ZYLOPRIM  
TAKE 1 TABLET BY MOUTH DAILY  
  
 aspirin 325 mg tablet Commonly known as:  ASPIRIN Take 325 mg by mouth daily. diph,Pertuss(Acell),Tet Vac-PF 2 Lf-(2.5-5-3-5 mcg)-5Lf/0.5 mL susp Commonly known as:  ADACEL  
0.5 mL by IntraMUSCular route once for 1 dose. fluticasone 50 mcg/actuation nasal spray Commonly known as:  Lella Solum 2 puffs each nostril at bedtime  
  
 pneumococcal 13 shant conj dip 0.5 mL Syrg injection Commonly known as:  PREVNAR-13  
0.5 mL by IntraMUSCular route once for 1 dose. Prescriptions Printed Refills  
 pneumococcal 13 shant conj dip (PREVNAR-13) 0.5 mL syrg injection 0 Si.5 mL by IntraMUSCular route once for 1 dose. Class: Print Route: IntraMUSCular  
 diph,Pertuss,Acell,,Tet Vac-PF (ADACEL) 2 Lf-(2.5-5-3-5 mcg)-5Lf/0.5 mL susp 0 Si.5 mL by IntraMUSCular route once for 1 dose. Class: Print Route: IntraMUSCular We Performed the Following HEPATIC FUNCTION PANEL [00236 CPT(R)] Patient Instructions Please do the followin. Be sure to obtain the vaccinations that we discussed. 2.  Please consider installing handrails in your bathroom. These can prevent falls. 3.  Also please get the blood work done that we need regarding your liver elevation. 4.  Schedule a Medicare wellness visit in the next 6 months. Have a great trip! Introducing [facility]! Dear Alexandra Lopez: Thank you for requesting a E[employer] account. Our records indicate that you already have an active E[employer] account. You can access your account anytime at [email or web address] Did you know that you can access your hospital and ER discharge instructions at any time in Ecohaus? You can also review all of your test results from your hospital stay or ER visit. Additional Information If you have questions, please visit the Frequently Asked Questions section of the E[employer] website at [email or web address] Remember, Ecohaus is NOT to be used for urgent needs. For medical emergencies, dial 911. Now available from your iPhone and Android! Please provide this summary of care documentation to your next provider. Your primary care clinician is listed as Masoud Patricio. If you have any questions after today's visit, please call [phone].

## 2017-11-29 NOTE — PATIENT INSTRUCTIONS
Please do the followin. Be sure to obtain the vaccinations that we discussed. 2.  Please consider installing handrails in your bathroom. These can prevent falls. 3.  Also please get the blood work done that we need regarding your liver elevation. 4.  Schedule a Medicare wellness visit in the next 6 months. Have a great trip!

## 2017-12-31 NOTE — PROGRESS NOTES
Follow up note    Denis Grullon is 65 y.o. male. he presents for follow up. Cardiovascular Review  The patient has hypertension. He reports taking medications as instructed, no medication side effects noted. Diet and Lifestyle: generally follows a low fat low cholesterol diet, generally follows a low sodium diet. Lab review: labs are reviewed, up to date. Most recent lipid panel reviewed, showing LDL result meets goal, labs reviewed and discussed with patient. He has had some increased LFT's noted on previous lab testing. Advised a repeat but he has not done this as of yet. He reports having a fall recently. He was stepping out of a dentist's office when he felt he mis-stepped and hit his knee and forearm. He denies having any dizziness prior to that fall. However, he does admit to drinking excessively. He feels this is due to depression after his wife's death (almost 10 years ago). He is still mourning. He notes drinking 2-3 large mixed drinks a night. Prior to Admission medications    Medication Sig Start Date End Date Taking? Authorizing Provider   naproxen sodium (ALEVE) 220 mg tablet Take 220 mg by mouth two (2) times daily (with meals). Yes Historical Provider   fluticasone (FLONASE) 50 mcg/actuation nasal spray 2 puffs each nostril at bedtime 2/4/16  Yes Saray Dias MD   aspirin (ASPIRIN) 325 mg tablet Take 325 mg by mouth daily.      Yes Historical Provider   allopurinol (ZYLOPRIM) 300 mg tablet TAKE 1 TABLET BY MOUTH DAILY 1/30/17   Saray Dias MD         Patient Active Problem List   Diagnosis Code    Other and unspecified hyperlipidemia E78.5    Essential hypertension, benign I10    Sinus bradycardia R00.1    LBBB (left bundle branch block) I44.7    NSVT (nonsustained ventricular tachycardia) ([facility]) I47.2    CHF (congestive heart failure) ([facility]) I50.9    Cardiomyopathy ([facility]) I42.9    [facility] I49.5    Grief reaction F43.20    Osteopenia M85.80    Cough due to ACE inhibitor R05, T46.4X5A         ROS      Visit Vitals    /80 (BP 1 Location: Right arm, BP Patient Position: Sitting)    Pulse 97    Temp 98 °F (36.7 °C) (Oral)    Resp 19    Ht 5' 1.26\" (1.556 m)    Wt 127 lb 9.6 oz (57.9 kg)    SpO2 98%    BMI 23.91 kg/m2       Physical Exam   Constitutional: No distress. Cardiovascular: Normal rate and regular rhythm. Pulmonary/Chest: Effort normal and breath sounds normal. He has no wheezes. ASSESSMENT/PLAN  Diagnoses and all orders for this visit:    1. Essential hypertension, benign    2. Fall, initial encounter - Advised the patient to cut down on drinking. This is likely the cause of his falling. He endorses understanding and will try to do so. 3. Need for Tdap vaccination  -     diph,Pertuss,Acell,,Tet Vac-PF (ADACEL) 2 Lf-(2.5-5-3-5 mcg)-5Lf/0.5 mL susp; 0.5 mL by IntraMUSCular route once for 1 dose. 4. Need for pneumococcal vaccination  -     pneumococcal 13 shant conj dip (PREVNAR-13) 0.5 mL syrg injection; 0.5 mL by IntraMUSCular route once for 1 dose. 5. Elevated LFTs  -     HEPATIC FUNCTION PANEL         Advised the patient to call back or return to office if symptoms worsen/change/persist.   Discussed expected course/resolution/complications of diagnosis in detail with patient. Medication risks/benefits/costs/interactions/alternatives discussed with patient. The patient was given an after visit summary which includes diagnoses, current medications, & vitals. They expressed understanding with the diagnosis and plan.

## 2018-02-12 ENCOUNTER — OFFICE VISIT (OUTPATIENT)
Dept: CARDIOLOGY CLINIC | Age: 78
End: 2018-02-12

## 2018-02-12 VITALS
HEIGHT: 61 IN | WEIGHT: 125 LBS | BODY MASS INDEX: 23.6 KG/M2 | DIASTOLIC BLOOD PRESSURE: 86 MMHG | SYSTOLIC BLOOD PRESSURE: 128 MMHG | HEART RATE: 82 BPM

## 2018-02-12 DIAGNOSIS — I42.9 CARDIOMYOPATHY, UNSPECIFIED TYPE (HCC): ICD-10-CM

## 2018-02-12 DIAGNOSIS — R05.8 COUGH DUE TO ACE INHIBITOR: ICD-10-CM

## 2018-02-12 DIAGNOSIS — T46.4X5A COUGH DUE TO ACE INHIBITOR: ICD-10-CM

## 2018-02-12 DIAGNOSIS — I44.0 HEART BLOCK AV FIRST DEGREE: ICD-10-CM

## 2018-02-12 DIAGNOSIS — I49.5 SSS (SICK SINUS SYNDROME) (HCC): ICD-10-CM

## 2018-02-12 DIAGNOSIS — I49.3 VENTRICULAR ECTOPIC ACTIVITY: Primary | ICD-10-CM

## 2018-02-12 DIAGNOSIS — I44.7 LBBB (LEFT BUNDLE BRANCH BLOCK): ICD-10-CM

## 2018-02-12 NOTE — PROGRESS NOTES
HISTORY OF PRESENT ILLNESS  Checo Amato is a 65 y.o. male. He has a history of mild cardiomyopathy and some ventricular ectopic activity as well as left bundle branch block. He drinks quite a bit of alcohol and does not want to stop. He has been depressed ever since his wife . He has actually lost twelve pounds since he was here six months ago and admits to having poor appetite or forgetting to eat. He is leaving to go on a cruise to [place]. HPI  Patient Active Problem List   Diagnosis Code    Other and unspecified hyperlipidemia E78.5    Essential hypertension, benign I10    Sinus bradycardia R00.1    LBBB (left bundle branch block) I44.7    NSVT (nonsustained ventricular tachycardia) ([facility]) I47.2    CHF (congestive heart failure) ([facility]) I50.9    Cardiomyopathy ([facility]) I42.9    SSS (sick sinus syndrome) ([facility]) I49.5    Grief reaction F43.20    Osteopenia M85.80    Cough due to ACE inhibitor R05, T46.4X5A     Current Outpatient Prescriptions   Medication Sig Dispense Refill    fluticasone (FLONASE) 50 mcg/actuation nasal spray 2 puffs each nostril at bedtime 1 Bottle 11    aspirin (ASPIRIN) 325 mg tablet Take 325 mg by mouth daily.  naproxen sodium (ALEVE) 220 mg tablet Take 220 mg by mouth two (2) times daily (with meals).  allopurinol (ZYLOPRIM) 300 mg tablet TAKE 1 TABLET BY MOUTH DAILY 90 Tab 3     Past Medical History:   Diagnosis Date    Atrial fibrillation (HCC)     CAD (coronary artery disease)     CHF (congestive heart failure) ([facility] 2013    Essential hypertension     Hyperlipidemia     Myocardial infarction     Rheumatoid arteritis     Stroke [facility])      Past Surgical History:   Procedure Laterality Date    HX APPENDECTOMY      HX CERVICAL LAMINECTOMY      HX OTHER SURGICAL  2006    VT ablation        Review of Systems   Constitutional: Positive for weight loss. Psychiatric/Behavioral: Positive for depression.    All other systems reviewed and are negative. Visit Vitals    /86 (BP 1 Location: Left arm, BP Patient Position: Sitting)    Pulse 82    Ht 5' 1\" (1.549 m)    Wt 125 lb (56.7 kg)    BMI 23.62 kg/m2       Physical Exam   Constitutional: He is oriented to person, place, and time. He appears well-nourished. HENT:   Head: Atraumatic. Eyes: Conjunctivae are normal.   Neck: Neck supple. Cardiovascular: Normal rate, regular rhythm and normal heart sounds. Exam reveals no gallop and no friction rub. No murmur heard. Pulmonary/Chest: Breath sounds normal. He has no wheezes. He has no rales. Abdominal: Bowel sounds are normal.   Musculoskeletal: He exhibits no edema. Neurological: He is oriented to person, place, and time. Skin: Skin is dry. Nursing note and vitals reviewed. ASSESSMENT and PLAN  He is stable from a cardiac standpoint but is suffering I believe from severe depression which affects his appetite. He also is drinking quite a bit of alcohol. He does not seem to be interested in changing therefore I will continue his regimen and see him in six months.

## 2018-02-12 NOTE — MR AVS SNAPSHOT
[address] [facility] [address] [facility] [address] [place] [address] 
[phone] Patient: Pablo Barlow MRN: [id number] :1940 Visit Information Date & Time Provider Department Dept. Phone Encounter #  
 2018  2:20 PM Vanesa Wilkes MD [facility] Yary Arguello [phone] [id number] Your Appointments 2018  2:20 PM  
ESTABLISHED PATIENT with Vanesa Wilkes MD  
[facility] ([facility]) Appt Note: 6 month follow up  
 [address]  
[address] [facility] 7 [phone] Upcoming Health Maintenance Date Due Pneumococcal 65+ Low/Medium Risk (2 of 2 - PCV13) 2014 DTaP/Tdap/Td series (2 - Td) 2016 MEDICARE YEARLY EXAM 2016 Influenza Age 5 to Adult 2017 GLAUCOMA SCREENING Q2Y 2017 Allergies as of 2018  Review Complete On: 2018 By: Shane Manzanares Severity Noted Reaction Type Reactions Pcn [Penicillins]  2016    Rash Current Immunizations  Reviewed on 2016 Name Date Influenza Vaccine 10/12/2015 Pneumococcal Polysaccharide (PPSV-23) 2013 Tdap 2006 Zoster Vaccine, Live 10/7/2013 Not reviewed this visit Vitals BP Pulse Height(growth percentile) Weight(growth percentile) BMI Smoking Status 128/86 (BP 1 Location: Left arm, BP Patient Position: Sitting) 82 5' 1\" (1.549 m) 125 lb (56.7 kg) 23.62 kg/m2 Former Smoker Vitals History BMI and BSA Data Body Mass Index Body Surface Area  
 23.62 kg/m 2 1.56 m 2 Preferred Pharmacy Pharmacy Name Phone [facility] [address] [phone] Your Updated Medication List  
  
   
This list is accurate as of: 18  2:12 PM.  Always use your most recent med list.  
  
  
  
  
 ALEVE 220 mg tablet Generic drug:  naproxen sodium Take 220 mg by mouth two (2) times daily (with meals). allopurinol 300 mg tablet Commonly known as:  ZYLOPRIM  
TAKE 1 TABLET BY MOUTH DAILY  
  
 aspirin 325 mg tablet Commonly known as:  ASPIRIN Take 325 mg by mouth daily. fluticasone 50 mcg/actuation nasal spray Commonly known as:  Tresa Medin 2 puffs each nostril at bedtime Introducing [facility] & [facility] SERVICES! Dear Aneta Meza: 
Thank you for requesting a [employer] account. Our records indicate that you already have an active [employer] account. You can access your account anytime at [email or web address] Did you know that you can access your hospital and ER discharge instructions at any time in [employer] You can also review all of your test results from your hospital stay or ER visit. Additional Information If you have questions, please visit the Frequently Asked Questions section of the [employer] website at [email or web address] Remember, [employer] is NOT to be used for urgent needs. For medical emergencies, dial 911. Now available from your iPhone and Android! Please provide this summary of care documentation to your next provider. Your primary care clinician is listed as Arturo Garcia. If you have any questions after today's visit, please call [phone].

## 2018-07-20 ENCOUNTER — OFFICE VISIT (OUTPATIENT)
Dept: CARDIOLOGY CLINIC | Age: 78
End: 2018-07-20

## 2018-07-20 VITALS
RESPIRATION RATE: 16 BRPM | HEART RATE: 74 BPM | DIASTOLIC BLOOD PRESSURE: 80 MMHG | BODY MASS INDEX: 25.37 KG/M2 | SYSTOLIC BLOOD PRESSURE: 120 MMHG | WEIGHT: 134.4 LBS | HEIGHT: 61 IN

## 2018-07-20 DIAGNOSIS — I47.29 NSVT (NONSUSTAINED VENTRICULAR TACHYCARDIA): ICD-10-CM

## 2018-07-20 DIAGNOSIS — R05.8 COUGH DUE TO ACE INHIBITOR: ICD-10-CM

## 2018-07-20 DIAGNOSIS — F43.21 GRIEF REACTION: ICD-10-CM

## 2018-07-20 DIAGNOSIS — T46.4X5A COUGH DUE TO ACE INHIBITOR: ICD-10-CM

## 2018-07-20 DIAGNOSIS — I44.7 LBBB (LEFT BUNDLE BRANCH BLOCK): ICD-10-CM

## 2018-07-20 DIAGNOSIS — I49.3 VENTRICULAR ECTOPIC ACTIVITY: ICD-10-CM

## 2018-07-20 DIAGNOSIS — I42.9 CARDIOMYOPATHY, UNSPECIFIED TYPE (HCC): Primary | ICD-10-CM

## 2018-07-20 DIAGNOSIS — I49.5 SSS (SICK SINUS SYNDROME) (HCC): ICD-10-CM

## 2018-07-20 NOTE — PROGRESS NOTES
HISTORY OF PRESENT ILLNESS  Stefania Burns is a 65 y.o. male. He is back a month early due to being slightly confused about when his appointment was. He has been traveling and continues to use alcohol. Overall, he has been doing fairly well, but he had an episode recently where he was trying to mow the lawn in 98 degree heat and he felt dizzy. He had some mild chest tightness and sat down in his garage and it went away. His last stress test was more than 3-4 years ago. His echocardiogram had shown an ejection fraction mildly reduced at 45%. He has had prolonged grief since the death of his wife several years ago. HPI  Patient Active Problem List   Diagnosis Code    Other and unspecified hyperlipidemia E78.5    Essential hypertension, benign I10    Sinus bradycardia R00.1    LBBB (left bundle branch block) I44.7    NSVT (nonsustained ventricular tachycardia) ([facility] I47.2    CHF (congestive heart failure) ([facility]) I50.9    Cardiomyopathy ([facility]) I42.9    SSS (sick sinus syndrome) ([facility]) I49.5    Grief reaction F43.20    Osteopenia M85.80    Cough due to ACE inhibitor R05, T46.4X5A     Current Outpatient Prescriptions   Medication Sig Dispense Refill    naproxen sodium (ALEVE) 220 mg tablet Take 220 mg by mouth two (2) times daily (with meals).  allopurinol (ZYLOPRIM) 300 mg tablet TAKE 1 TABLET BY MOUTH DAILY 90 Tab 3    fluticasone (FLONASE) 50 mcg/actuation nasal spray 2 puffs each nostril at bedtime 1 Bottle 11    aspirin (ASPIRIN) 325 mg tablet Take 325 mg by mouth daily.          Past Medical History:   Diagnosis Date    Atrial fibrillation ([address]     CAD (coronary artery disease)     [facility]est[facility] 2/27/2013    Essential hypertension     Hyperlipidemia     Myocardial infarction ([facility]     Rheumatoid arteritis     Stroke [facility])      Past Surgical History:   Procedure Laterality Date    HX APPENDECTOMY      HX CERVICAL LAMINECTOMY      HX OTHER SURGICAL  2006    VT ablation        Review of Systems   Cardiovascular: Positive for chest pain. Neurological: Positive for dizziness and weakness. All other systems reviewed and are negative. Visit Vitals    /80 (BP 1 Location: Left arm, BP Patient Position: Sitting)    Pulse 74    Resp 16    Ht 5' 1\" (1.549 m)    Wt 134 lb 6.4 oz (61 kg)    BMI 25.39 kg/m2       Physical Exam   Constitutional: He is oriented to person, place, and time. He appears well-nourished. HENT:   Head: Atraumatic. Eyes: Conjunctivae are normal.   Neck: Neck supple. Cardiovascular: Normal rate, regular rhythm and normal heart sounds. Exam reveals no gallop and no friction rub. No murmur heard. Pulmonary/Chest: Breath sounds normal. He has no wheezes. Abdominal: Bowel sounds are normal.   Musculoskeletal: He exhibits no edema. Neurological: He is oriented to person, place, and time. Skin: Skin is dry. Psychiatric: His behavior is normal.   Nursing note and vitals reviewed. ASSESSMENT and PLAN  Overall he has been doing fairly well. I am somewhat concerned about his recent episode and I suggested that we repeat the stress test. Despite going to [place] and walking around, he feels he cannot walk fast on the treadmill and therefore, the test would have to be done with [facility]. He does not want to do this at this point. I will see him back in 6 months.  I have told him that if another spell like this occurs, he is to call and we will pursue a stress test.

## 2018-07-20 NOTE — MR AVS SNAPSHOT
[address] [place] [address] [address] [facility] 
[phone] Patient: Felipe Ingram MRN: [id number] :1940 Visit Information Date & Time Provider Department Dept. Phone Encounter #  
 2018  1:00 PM Valentina Crawley MD [facility] Morales [phone] [id number] Upcoming Health Maintenance Date Due Pneumococcal 65+ Low/Medium Risk (2 of 2 - PCV13) 2014 DTaP/Tdap/Td series (2 - Td) 2016 GLAUCOMA SCREENING Q2Y 2017 MEDICARE YEARLY EXAM 3/14/2018 Influenza Age 5 to Adult 2018 Allergies as of 2018  Review Complete On: 2018 By: Valentina Crawley MD  
  
 Severity Noted Reaction Type Reactions Pcn [Penicillins]  2016    Rash Current Immunizations  Reviewed on 2016 Name Date Influenza Vaccine 10/12/2015 Pneumococcal Polysaccharide (PPSV-23) 2013 Tdap 2006 Zoster Vaccine, Live 10/7/2013 Not reviewed this visit Vitals BP Pulse Resp Height(growth percentile) Weight(growth percentile) BMI  
 120/80 (BP 1 Location: Left arm, BP Patient Position: Sitting) 74 16 5' 1\" (1.549 m) 134 lb 6.4 oz (61 kg) 25.39 kg/m2 Smoking Status Former Smoker Vitals History BMI and BSA Data Body Mass Index Body Surface Area  
 25.39 kg/m 2 1.62 m 2 Preferred Pharmacy Pharmacy Name Phone [facility] [address] AT  [address] [phone] Your Updated Medication List  
  
   
This list is accurate as of 18  1:12 PM.  Always use your most recent med list.  
  
  
  
  
 ALEVE 220 mg tablet Generic drug:  naproxen sodium Take 220 mg by mouth two (2) times daily (with meals). allopurinol 300 mg tablet Commonly known as:  ZYLOPRIM  
TAKE 1 TABLET BY MOUTH DAILY  
  
 aspirin 325 mg tablet Commonly known as:  ASPIRIN  
 Take 325 mg by mouth daily. fluticasone 50 mcg/actuation nasal spray Commonly known as:  Caffie Euler 2 puffs each nostril at bedtime Introducing [facility] & [facility] SERVICES! Dear Divya Benson: 
Thank you for requesting a [employer] account. Our records indicate that you already have an active [employer] account. You can access your account anytime at [email or web address]. [employer] Did you know that you can access your hospital and ER discharge instructions at any time in [employer]? You can also review all of your test results from your hospital stay or ER visit. Additional Information If you have questions, please visit the Frequently Asked Questions section of the [employer] website at [email or web address] Remember, [employer] is NOT to be used for urgent needs. For medical emergencies, dial 911. Now available from your iPhone and Android! Please provide this summary of care documentation to your next provider. Your primary care clinician is listed as Larry Everett. If you have any questions after today's visit, please call [phone].

## 2018-09-24 ENCOUNTER — OFFICE VISIT (OUTPATIENT)
Dept: INTERNAL MEDICINE CLINIC | Age: 78
End: 2018-09-24

## 2018-09-24 VITALS
WEIGHT: 135 LBS | DIASTOLIC BLOOD PRESSURE: 78 MMHG | BODY MASS INDEX: 25.49 KG/M2 | SYSTOLIC BLOOD PRESSURE: 119 MMHG | HEART RATE: 78 BPM | HEIGHT: 61 IN | TEMPERATURE: 98.3 F

## 2018-09-24 DIAGNOSIS — T14.8XXA INFECTED WOUND: ICD-10-CM

## 2018-09-24 DIAGNOSIS — Z12.5 PROSTATE CANCER SCREENING: ICD-10-CM

## 2018-09-24 DIAGNOSIS — Z00.00 MEDICARE ANNUAL WELLNESS VISIT, SUBSEQUENT: ICD-10-CM

## 2018-09-24 DIAGNOSIS — Z00.00 MEDICARE ANNUAL WELLNESS VISIT, INITIAL: Primary | ICD-10-CM

## 2018-09-24 DIAGNOSIS — L08.9 INFECTED WOUND: ICD-10-CM

## 2018-09-24 DIAGNOSIS — Z13.5 GLAUCOMA SCREENING: ICD-10-CM

## 2018-09-24 RX ORDER — DOXYCYCLINE 100 MG/1
100 TABLET ORAL 2 TIMES DAILY
Qty: 20 TAB | Refills: 0 | Status: SHIPPED | OUTPATIENT
Start: 2018-09-24 | End: 2018-10-04

## 2018-09-24 NOTE — PROGRESS NOTES
PHQ over the last two weeks 9/24/2018 Little interest or pleasure in doing things Nearly every day Feeling down, depressed, irritable, or hopeless Not at all Total Score PHQ 2 3 Trouble falling or staying asleep, or sleeping too much Nearly every day Feeling tired or having little energy More than half the days Poor appetite, weight loss, or overeating Not at all Feeling bad about yourself - or that you are a failure or have let yourself or your family down Not at all Trouble concentrating on things such as school, work, reading, or watching TV Not at all Moving or speaking so slowly that other people could have noticed; or the opposite being so fidgety that others notice Not at all Thoughts of being better off dead, or hurting yourself in some way Not at all PHQ 9 Score 8 How difficult have these problems made it for you to do your work, take care of your home and get along with others Not difficult at all

## 2018-09-24 NOTE — MR AVS SNAPSHOT
[address] [facility] [address] [place] 
[phone] Patient: Elvi Milner MRN: [id number] :1940 Visit Information Date & Time Provider Department Dept. Phone Encounter #  
 2018  3:00 PM Tam Hussein MD [facility] Internal Medicine [phone] [id number] Follow-up Instructions Return in about 6 months (around 3/24/2019) for Follow up. Your Appointments 2019  1:00 PM  
ESTABLISHED PATIENT with Marcia Isabel MD  
[facility] (KERRI SCHEDULING) Appt Note: 6 mo f/u per Dr. Ludwig Mast [address]  [address] [place] [address]  
[address] [phone] Upcoming Health Maintenance Date Due Shingrix Vaccine Age 50> (1 of 2) 1989 Pneumococcal 65+ Low/Medium Risk (2 of 2 - PCV13) 2014 DTaP/Tdap/Td series (2 - Td) 2016 GLAUCOMA SCREENING Q2Y 2017 MEDICARE YEARLY EXAM 3/14/2018 Influenza Age 5 to Adult 2018 Allergies as of 2018  Review Complete On: 2018 By: Carmen London Severity Noted Reaction Type Reactions Pcn [Penicillins]  2016    Rash Current Immunizations  Reviewed on 2016 Name Date Influenza Vaccine 10/12/2015 Pneumococcal Polysaccharide (PPSV-23) 2013 Tdap 2006 Zoster Vaccine, Live 10/7/2013 Not reviewed this visit You Were Diagnosed With   
  
 Codes Comments Medicare annual wellness visit, initial    -  Primary ICD-10-CM: Z00.00 ICD-9-CM: V70.0 Medicare annual wellness visit, subsequent     ICD-10-CM: Z00.00 ICD-9-CM: V70.0 Glaucoma screening     ICD-10-CM: Z13.5 ICD-9-CM: V80.1 Prostate cancer screening     ICD-10-CM: Z12.5 ICD-9-CM: V76.44 Vitals BP Pulse Temp Height(growth percentile) Weight(growth percentile) BMI 119/78 (BP 1 Location: Right arm, BP Patient Position: Sitting) 78 98.3 °F (36.8 °C) (Oral) 5' 1\" (1.549 m) 135 lb (61.2 kg) 25.51 kg/m2 Smoking Status Former Smoker Vitals History BMI and BSA Data Body Mass Index Body Surface Area 25.51 kg/m 2 1.62 m 2 Preferred Pharmacy Pharmacy Name Phone [facility] [address] AT [address] [phone] Your Updated Medication List  
  
   
This list is accurate as of 9/24/18  3:59 PM.  Always use your most recent med list.  
  
  
  
  
 ALEVE 220 mg tablet Generic drug:  naproxen sodium Take 220 mg by mouth two (2) times daily (with meals). allopurinol 300 mg tablet Commonly known as:  ZYLOPRIM  
TAKE 1 TABLET BY MOUTH DAILY  
  
 aspirin 325 mg tablet Commonly known as:  ASPIRIN Take 325 mg by mouth daily. fluticasone 50 mcg/actuation nasal spray Commonly known as:  Cathlyn Katy 2 puffs each nostril at bedtime We Performed the Following CBC WITH AUTOMATED DIFF [01452 CPT(R)] LIPID PANEL [73559 CPT(R)] METABOLIC PANEL, COMPREHENSIVE [43196 CPT(R)] PSA SCREENING (SCREENING) [ [facility] [place]] REFERRAL TO OPHTHALMOLOGY [REF57 Custom] Follow-up Instructions Return in about 6 months (around 3/24/2019) for Follow up. Referral Information Referral ID Referred By Referred To  
  
 [id number] [address] [id number] [facility], [address] [facility], [address] Visits Status Start Date End Date 1 New Request 9/24/18 9/24/19 If your referral has a status of pending review or denied, additional information will be sent to support the outcome of this decision. Patient Instructions Well Visit, Over 72: Care Instructions Your Care Instructions Physical exams can help you stay healthy.  Your doctor has checked your overall health and may have suggested ways to take good care of yourself. He or she also may have recommended tests. At home, you can help prevent illness with healthy eating, regular exercise, and other steps. Follow-up care is a key part of your treatment and safety. Be sure to make and go to all appointments, and call your doctor if you are having problems. It's also a good idea to know your test results and keep a list of the medicines you take. How can you care for yourself at home? · Reach and stay at a healthy weight. This will lower your risk for many problems, such as obesity, diabetes, heart disease, and high blood pressure. · Get at least 30 minutes of exercise on most days of the week. Walking is a good choice. You also may want to do other activities, such as running, swimming, cycling, or playing tennis or team sports. · Do not smoke. Smoking can make health problems worse. If you need help quitting, talk to your doctor about stop-smoking programs and medicines. These can increase your chances of quitting for good. · Protect your skin from too much sun. When you're outdoors from 10 a.m. to 4 p.m., stay in the shade or cover up with clothing and a hat with a wide brim. Wear sunglasses that block UV rays. Even when it's cloudy, put broad-spectrum sunscreen (SPF 30 or higher) on any exposed skin. · See a dentist one or two times a year for checkups and to have your teeth cleaned. · Wear a seat belt in the car. · Limit alcohol to 2 drinks a day for men and 1 drink a day for women. Too much alcohol can cause health problems. Follow your doctor's advice about when to have certain tests. These tests can spot problems early. For men and women · Cholesterol. Your doctor will tell you how often to have this done based on your overall health and other things that can increase your risk for heart attack and stroke. · Blood pressure.  Have your blood pressure checked during a routine doctor visit. Your doctor will tell you how often to check your blood pressure based on your age, your blood pressure results, and other factors. · Diabetes. Ask your doctor whether you should have tests for diabetes. · Vision. Experts recommend that you have yearly exams for glaucoma and other age-related eye problems. · Hearing. Tell your doctor if you notice any change in your hearing. You can have tests to find out how well you hear. · Colon cancer tests. Keep having colon cancer tests as your doctor recommends. You can have one of several types of tests. · Heart attack and stroke risk. At least every 4 to 6 years, you should have your risk for heart attack and stroke assessed. Your doctor uses factors such as your age, blood pressure, cholesterol, and whether you smoke or have diabetes to show what your risk for a heart attack or stroke is over the next 10 years. · Osteoporosis. Talk to your doctor about whether you should have a bone density test to find out whether you have thinning bones. Also ask your doctor about whether you should take calcium and vitamin D supplements. For women · Pap test and pelvic exam. You may no longer need a Pap test. Talk with your doctor about whether to stop or continue to have Pap tests. · Breast exam and mammogram. Ask how often you should have a mammogram, which is an X-ray of your breasts. A mammogram can spot breast cancer before it can be felt and when it is easiest to treat. · Thyroid disease. Talk to your doctor about whether to have your thyroid checked as part of a regular physical exam. Women have an increased chance of a thyroid problem. For men · Prostate exam. Talk to your doctor about whether you should have a blood test (called a PSA test) for prostate cancer. Experts disagree on whether men should have this test. Some experts recommend that you discuss the benefits and risks of the test with your doctor. · Abdominal aortic aneurysm. Ask your doctor whether you should have a test to check for an aneurysm. You may need a test if you ever smoked or if your parent, brother, sister, or child has had an aneurysm. When should you call for help? Watch closely for changes in your health, and be sure to contact your doctor if you have any problems or symptoms that concern you. Where can you learn more? Go to http://vamshi-abena.info/. Enter G647 in the search box to learn more about \"Well Visit, Over 65: Care Instructions. \" Current as of: May 16, 2017 Content Version: 11.7 © 8784-7102 [employer]. Care instructions adapted under license by [employer] (which disclaims liability or warranty for this information). If you have questions about a medical condition or this instruction, always ask your healthcare professional. [address] any warranty or liability for your use of this information. Introducing [facility] & HEALTH SERVICES! Dear Tennille Mcbride: 
Thank you for requesting a "[employer] account. Our records indicate that you already have an active "[employer] account. You can access your account anytime at [email or web address] Did you know that you can access your hospital and ER discharge instructions at any time in "[employer]? You can also review all of your test results from your hospital stay or ER visit. Additional Information If you have questions, please visit the Frequently Asked Questions section of the "[employer] website at [email or web address] Remember, "[employer] is NOT to be used for urgent needs. For medical emergencies, dial 911. Now available from your iPhone and Android! Please provide this summary of care documentation to your next provider. Your primary care clinician is listed as Flex Seaman. If you have any questions after today's visit, please call [phone].

## 2018-09-24 NOTE — PATIENT INSTRUCTIONS

## 2018-09-24 NOTE — PROGRESS NOTES
This is a Subsequent Medicare Annual Wellness Visit providing Personalized Prevention Plan Services (PPPS) (Performed 12 months after initial AWV and PPPS ) I have reviewed the patient's medical history in detail and updated the computerized patient record. The patient comes if for follow up. He has continually taken his medication. He denies recent chest pain or shortness of breath. He has followed up with cardiology this past summer and was stable at that visit. He has been stable in this regard. He had experienced some dizziness and chest tightness over the summer and was advised to have a stress test if it recurred. He has not had a further issue. He continues to drink to excess. We discussed this and the patient advised that he was not going to stop drinking. He has had elevated liver enzymes in the past, we discussed this as well. History Past Medical History:  
Diagnosis Date  [facility]rial fibrillation [facility]  CAD (coronary artery disease)  CHF (congestive heart failure) ([facility] 2/27/2013  Essential hypertension  Hyperlipidemia  Myocardial infarction ([facility]  Rheumatoid arteritis  Stroke ([facility] Past Surgical History:  
Procedure Laterality Date  HX APPENDECTOMY  HX CERVICAL LAMINECTOMY  HX OTHER SURGICAL  2006 VT ablation Current Outpatient Prescriptions Medication Sig Dispense Refill  naproxen sodium (ALEVE) 220 mg tablet Take 220 mg by mouth two (2) times daily (with meals).  allopurinol (ZYLOPRIM) 300 mg tablet TAKE 1 TABLET BY MOUTH DAILY 90 Tab 3  
 fluticasone (FLONASE) 50 mcg/actuation nasal spray 2 puffs each nostril at bedtime 1 Bottle 11  
 aspirin (ASPIRIN) 325 mg tablet Take 325 mg by mouth daily. Allergies Allergen Reactions  Pcn [Penicillins] Rash Family History Problem Relation Age of Onset  Cancer Mother  Heart Disease Brother Social History Substance Use Topics  Smoking status: Former Smoker  Smokeless tobacco: Never Used  Alcohol use 4.8 oz/week 8 Standard drinks or equivalent per week Comment: DAILY Patient Active Problem List  
Diagnosis Code  Other and unspecified hyperlipidemia E78.5  Essential hypertension, benign I10  
 Sinus bradycardia R00.1  LBBB (left bundle branch block) I44.7  NSVT (nonsustained ventricular tachycardia) ([facility]) I47.2  CHF (congestive heart failure) ([facility]) I50.9  Cardiomyopathy ([address] I42.9  SSS (sick sinus syndrome) ([facility]) I49.5  Grief reaction F43.20  Osteopenia M85.80  Cough due to ACE inhibitor R05, T46.4X5A Depression Risk Factor Screening: PHQ over the last two weeks 11/29/2017 Little interest or pleasure in doing things Not at all Feeling down, depressed, irritable, or hopeless Not at all Total Score PHQ 2 0 Alcohol Risk Factor Screening: You average more than 14 drinks a week. Functional Ability and Level of Safety:  
 
Hearing Loss Hearing is good. Activities of Daily Living Self-care. Requires assistance with: no ADLs Fall Risk Fall Risk Assessment, last 12 mths 11/29/2017 Able to walk? Yes Fall in past 12 months? Yes Fall with injury? Yes  
Number of falls in past 12 months 2 Fall Risk Score 3 Abuse Screen Patient is not abused Review of Systems A comprehensive review of systems was negative except for that written in the HPI. Physical Examination Evaluation of Cognitive Function: 
Mood/affect:  neutral 
Appearance: age appropriate Family member/caregiver input: n/a Visit Vitals  /78 (BP 1 Location: Right arm, BP Patient Position: Sitting)  Pulse 78  Temp 98.3 °F (36.8 °C) (Oral)  Ht 5' 1\" (1.549 m)  Wt 135 lb (61.2 kg)  BMI 25.51 kg/m2 General appearance: alert, cooperative, appears stated age Neck: supple, symmetrical, trachea midline and no adenopathy Back: symmetric, no curvature. ROM normal. No CVA tenderness. Patient Care Team: 
Coy Troy MD as PCP - General (Internal Medicine) Brook Ferrer MD (Family Practice) Suhail Hernandez MD (Cardiology) Advice/Referrals/Counseling Education and counseling provided: 
Are appropriate based on today's review and evaluation Assessment/Plan Diagnoses and all orders for this visit: 
 
1. Medicare annual wellness visit, initial 
-     CBC WITH AUTOMATED DIFF 
-     METABOLIC PANEL, COMPREHENSIVE 
-     LIPID PANEL 2. Medicare annual wellness visit, subsequent 3. Glaucoma screening 
-     REFERRAL TO OPHTHALMOLOGY 4. Prostate cancer screening -     PSA SCREENING (SCREENING) 5. Infected wound 
-     doxycycline (ADOXA) 100 mg tablet; Take 1 Tab by mouth two (2) times a day for 10 days. Follow-up Disposition: 
Return in about 6 months (around 3/24/2019) for Follow up. Andrzej Horton

## 2018-09-25 ENCOUNTER — HOSPITAL ENCOUNTER (OUTPATIENT)
Dept: LAB | Age: 78
Discharge: HOME | End: 2018-09-25
Payer: MEDICARE

## 2018-09-25 PROCEDURE — 85025 COMPLETE CBC W/AUTO DIFF WBC: CPT

## 2018-09-25 PROCEDURE — 84153 ASSAY OF PSA TOTAL: CPT

## 2018-09-25 PROCEDURE — 80053 COMPREHEN METABOLIC PANEL: CPT

## 2018-09-25 PROCEDURE — 80061 LIPID PANEL: CPT

## 2018-09-25 PROCEDURE — 36415 COLL VENOUS BLD VENIPUNCTURE: CPT

## 2018-09-26 LAB
ALBUMIN SERPL-MCNC: 4.1 G/DL (ref 3.5–4.8)
ALBUMIN/GLOB SERPL: 1.6 {RATIO} (ref 1.2–2.2)
ALP SERPL-CCNC: 208 IU/L (ref 39–117)
ALT SERPL-CCNC: 88 IU/L (ref 0–44)
AST SERPL-CCNC: 123 IU/L (ref 0–40)
BASOPHILS # BLD AUTO: 0 X10E3/UL (ref 0–0.2)
BASOPHILS NFR BLD AUTO: 1 %
BILIRUB SERPL-MCNC: 2 MG/DL (ref 0–1.2)
BUN SERPL-MCNC: 5 MG/DL (ref 8–27)
BUN/CREAT SERPL: 5 (ref 10–24)
CALCIUM SERPL-MCNC: 9.2 MG/DL (ref 8.6–10.2)
CHLORIDE SERPL-SCNC: 101 MMOL/L (ref 96–106)
CHOLEST SERPL-MCNC: 172 MG/DL (ref 100–199)
CO2 SERPL-SCNC: 24 MMOL/L (ref 20–29)
CREAT SERPL-MCNC: 0.95 MG/DL (ref 0.76–1.27)
EOSINOPHIL # BLD AUTO: 0.1 X10E3/UL (ref 0–0.4)
EOSINOPHIL NFR BLD AUTO: 1 %
ERYTHROCYTE [DISTWIDTH] IN BLOOD BY AUTOMATED COUNT: 16.7 % (ref 12.3–15.4)
GLOBULIN SER CALC-MCNC: 2.6 G/DL (ref 1.5–4.5)
GLUCOSE SERPL-MCNC: 96 MG/DL (ref 65–99)
HCT VFR BLD AUTO: 46.7 % (ref 37.5–51)
HDLC SERPL-MCNC: 58 MG/DL
HGB BLD-MCNC: 15.8 G/DL (ref 13–17.7)
IMM GRANULOCYTES # BLD: 0 X10E3/UL (ref 0–0.1)
IMM GRANULOCYTES NFR BLD: 0 %
LDLC SERPL CALC-MCNC: 92 MG/DL (ref 0–99)
LYMPHOCYTES # BLD AUTO: 1.9 X10E3/UL (ref 0.7–3.1)
LYMPHOCYTES NFR BLD AUTO: 33 %
MCH RBC QN AUTO: 37.7 PG (ref 26.6–33)
MCHC RBC AUTO-ENTMCNC: 33.8 G/DL (ref 31.5–35.7)
MCV RBC AUTO: 112 FL (ref 79–97)
MONOCYTES # BLD AUTO: 0.5 X10E3/UL (ref 0.1–0.9)
MONOCYTES NFR BLD AUTO: 8 %
NEUTROPHILS # BLD AUTO: 3.3 X10E3/UL (ref 1.4–7)
NEUTROPHILS NFR BLD AUTO: 57 %
PLATELET # BLD AUTO: 255 X10E3/UL (ref 150–379)
POTASSIUM SERPL-SCNC: 5.2 MMOL/L (ref 3.5–5.2)
PROT SERPL-MCNC: 6.7 G/DL (ref 6–8.5)
PSA SERPL-MCNC: 3.7 NG/ML (ref 0–4)
RBC # BLD AUTO: 4.19 X10E6/UL (ref 4.14–5.8)
SODIUM SERPL-SCNC: 142 MMOL/L (ref 134–144)
TRIGL SERPL-MCNC: 112 MG/DL (ref 0–149)
VLDLC SERPL CALC-MCNC: 22 MG/DL (ref 5–40)
WBC # BLD AUTO: 5.7 X10E3/UL (ref 3.4–10.8)

## 2019-01-23 ENCOUNTER — OFFICE VISIT (OUTPATIENT)
Dept: CARDIOLOGY CLINIC | Age: 79
End: 2019-01-23

## 2019-01-23 VITALS
WEIGHT: 133.4 LBS | DIASTOLIC BLOOD PRESSURE: 84 MMHG | HEIGHT: 61 IN | BODY MASS INDEX: 25.19 KG/M2 | HEART RATE: 85 BPM | SYSTOLIC BLOOD PRESSURE: 126 MMHG | OXYGEN SATURATION: 97 % | RESPIRATION RATE: 19 BRPM

## 2019-01-23 DIAGNOSIS — I42.9 CARDIOMYOPATHY, UNSPECIFIED TYPE (HCC): ICD-10-CM

## 2019-01-23 DIAGNOSIS — T46.4X5A COUGH DUE TO ACE INHIBITOR: ICD-10-CM

## 2019-01-23 DIAGNOSIS — I44.0 HEART BLOCK AV FIRST DEGREE: ICD-10-CM

## 2019-01-23 DIAGNOSIS — R05.8 COUGH DUE TO ACE INHIBITOR: ICD-10-CM

## 2019-01-23 DIAGNOSIS — I44.7 LBBB (LEFT BUNDLE BRANCH BLOCK): ICD-10-CM

## 2019-01-23 DIAGNOSIS — I49.3 VENTRICULAR ECTOPIC ACTIVITY: ICD-10-CM

## 2019-01-23 DIAGNOSIS — I49.5 SSS (SICK SINUS SYNDROME) (HCC): Primary | ICD-10-CM

## 2019-01-23 NOTE — PROGRESS NOTES
HISTORY OF PRESENT ILLNESS Vita Varghese is a 77 y.o. male. He has left bundle branch block and sick sinus syndrome. He has a remote history of left ventricular dysfunction which seems to have improved. Ever since his wife  unexpectedly several years ago, he has not been interested in pursuing any further evaluation. He continues to travel the world with a friend. He also drinks a fair amount of wine. He has no complaints. He recently took a ship to the [place] and also to [facility] and walked without difficulty although after several hundred yards, he would stop due to fatigue. He takes no cardiac medications except aspirin. HPI Patient Active Problem List  
Diagnosis Code  Other and unspecified hyperlipidemia E78.5  Essential hypertension, benign I10  
 Sinus bradycardia R00.1  LBBB (left bundle branch block) I44.7  NSVT (nonsustained ventricular tachycardia) ([facility]) I47.2  CHF (congestive heart failure) ([facility]) I50.9  Cardiomyopathy ([facility] I42.9  SSS (sick sinus syndrome) ([facility]) I49.5  Grief reaction F43.21  
 Osteopenia M85.80  Cough due to ACE inhibitor R05, T46.4X5A Current Outpatient Medications Medication Sig Dispense Refill  fluticasone (FLONASE) 50 mcg/actuation nasal spray 2 puffs each nostril at bedtime 1 Bottle 11  
 aspirin (ASPIRIN) 325 mg tablet Take 325 mg by mouth daily.  naproxen sodium (ALEVE) 220 mg tablet Take 220 mg by mouth two (2) times daily (with meals).  allopurinol (ZYLOPRIM) 300 mg tablet TAKE 1 TABLET BY MOUTH DAILY 90 Tab 3 Past Medical History:  
Diagnosis Date  Atrial fibrillation ([address]  CAD (coronary artery disease)  CHF (congestive heart failure) ([address] 2013  Essential hypertension  Hyperlipidemia  Myocardial infarction ([address]  Rheumatoid arteritis  Stroke ([address] Past Surgical History:  
Procedure Laterality Date  HX APPENDECTOMY  HX CERVICAL LAMINECTOMY  HX OTHER SURGICAL  2006 VT ablation Review of Systems Constitutional: Positive for malaise/fatigue. All other systems reviewed and are negative. Visit Vitals /84 (BP 1 Location: Right arm, BP Patient Position: Sitting) Pulse 85 Resp 19 Ht 5' 1\" (1.549 m) Wt 133 lb 6.4 oz (60.5 kg) SpO2 97% BMI 25.21 kg/m² Physical Exam  
Constitutional: He is oriented to person, place, and time. He appears well-nourished. HENT:  
Head: Atraumatic. Eyes: Conjunctivae are normal.  
Neck: Neck supple. Cardiovascular: Normal rate, regular rhythm and normal heart sounds. Exam reveals no gallop and no friction rub. No murmur heard. Pulmonary/Chest: Breath sounds normal. He has no wheezes. Abdominal: Bowel sounds are normal.  
Musculoskeletal: He exhibits no edema. Neurological: He is oriented to person, place, and time. Skin: Skin is dry. Psychiatric: His behavior is normal.  
Nursing note and vitals reviewed. ASSESSMENT and PLAN He is doing well and enjoying his life at this time. I will continue him on this regimen and see him in six months.

## 2019-01-23 NOTE — PROGRESS NOTES
Chief Complaint Patient presents with  Follow-up 1. Have you been to the ER, urgent care clinic since your last visit? Hospitalized since your last visit? No 
 
2. Have you seen or consulted any other health care providers outside of the [address] [place] [address] since your last visit? Include any pap smears or colon screening. No 
 
3) Do you have an Advance Directive on file? no 
 
Patient is accompanied by self I have received verbal consent from Odalis Delvalle to discuss any/all medical information while they are present in the room.

## 2023-05-19 RX ORDER — FLUTICASONE PROPIONATE 50 MCG
SPRAY, SUSPENSION (ML) NASAL
COMMUNITY
Start: 2016-02-04

## 2023-05-19 RX ORDER — NAPROXEN SODIUM 220 MG
220 TABLET ORAL 2 TIMES DAILY WITH MEALS
COMMUNITY

## 2023-05-19 RX ORDER — ASPIRIN 325 MG
325 TABLET ORAL DAILY
COMMUNITY

## 2023-05-19 RX ORDER — ALLOPURINOL 300 MG/1
1 TABLET ORAL DAILY
COMMUNITY
Start: 2017-01-30